# Patient Record
Sex: MALE
[De-identification: names, ages, dates, MRNs, and addresses within clinical notes are randomized per-mention and may not be internally consistent; named-entity substitution may affect disease eponyms.]

---

## 2022-02-07 PROBLEM — Z00.00 ENCOUNTER FOR PREVENTIVE HEALTH EXAMINATION: Status: ACTIVE | Noted: 2022-02-07

## 2023-06-28 ENCOUNTER — APPOINTMENT (OUTPATIENT)
Dept: ORTHOPEDIC SURGERY | Facility: CLINIC | Age: 68
End: 2023-06-28
Payer: MEDICARE

## 2023-06-28 VITALS — HEART RATE: 76 BPM | SYSTOLIC BLOOD PRESSURE: 124 MMHG | DIASTOLIC BLOOD PRESSURE: 84 MMHG

## 2023-06-28 DIAGNOSIS — M16.11 UNILATERAL PRIMARY OSTEOARTHRITIS, RIGHT HIP: ICD-10-CM

## 2023-06-28 DIAGNOSIS — Z79.891 LONG TERM (CURRENT) USE OF OPIATE ANALGESIC: ICD-10-CM

## 2023-06-28 DIAGNOSIS — S72.001A FRACTURE OF UNSPECIFIED PART OF NECK OF RIGHT FEMUR, INITIAL ENCOUNTER FOR CLOSED FRACTURE: ICD-10-CM

## 2023-06-28 DIAGNOSIS — M17.11 UNILATERAL PRIMARY OSTEOARTHRITIS, RIGHT KNEE: ICD-10-CM

## 2023-06-28 PROCEDURE — 27230 TREAT THIGH FRACTURE: CPT | Mod: RT

## 2023-06-28 PROCEDURE — 73502 X-RAY EXAM HIP UNI 2-3 VIEWS: CPT | Mod: RT

## 2023-06-28 PROCEDURE — 20610 DRAIN/INJ JOINT/BURSA W/O US: CPT | Mod: RT

## 2023-06-28 PROCEDURE — 73562 X-RAY EXAM OF KNEE 3: CPT | Mod: RT

## 2023-06-28 PROCEDURE — 99204 OFFICE O/P NEW MOD 45 MIN: CPT | Mod: 25,57

## 2023-06-28 NOTE — PHYSICAL EXAM
[LE] : Sensory: Intact in bilateral lower extremities [ALL] : dorsalis pedis, posterior tibial, femoral, popliteal, and radial 2+ and symmetric bilaterally [de-identified] : GENERAL APPEARANCE: Well nourished and hydrated, pleasant, alert, and oriented x 3. Appears their stated age. \par HEENT: Normocephalic, extraocular eye motion intact. Nasal septum midline. Oral cavity clear. External auditory canal clear. \par RESPIRATORY: Breath sounds clear and audible in all lobes. No wheezing, No accessory muscle use.\par CARDIOVASCULAR: No apparent abnormalities. No lower leg edema. No varicosities. Pedal pulses are palpable.\par NEUROLOGIC: Sensation is normal, no muscle weakness in the upper or lower extremities.\par DERMATOLOGIC: No apparent skin lesions, moist, warm, no rash.\par SPINE: Cervical spine appears normal and moves freely; thoracic spine appears normal and moves freely; lumbosacral spine appears normal and moves freely, normal, nontender.\par MUSCULOSKELETAL: Hands, wrists, and elbows are normal and move freely, shoulders are normal and move freely.  [de-identified] : Right hip exam shows no pain with ROM, -stinchfield \par Right knee exam shows medial jointline tenderness FROM [de-identified] : 3V xray of the right hip done in the office today and reviewed by Dr. Rajiv Ivy demonstrates mild right hip osteoarthritis \par \par 3V xray of the right knee done in the office today and reviewed by Dr. Rajiv Ivy demonstrates		 \par \par MRI of the right hip done at  on April 19th, 2023 shows: \par 1. Extensive bone marrow edema involving the posterior right femoral head and neck w/o associated discrete fracture line or compression deformity. Image findings suggestive of avascular necrosis and/or stress fracture/reaction.\par 2. Anterior superior labral tearing. Prominence of the anterolateral abdomen junction history of CAM-type femoral acetabular impingement morphology. \par 3. The right hip joint articular cartilage demonstrates mild-moderate diffuse thinning. \par 4. Small right-sided joint effusion. \par 5. Mild left greater than right greater trochanteric bursitis. \par 6. The scrotum demonstrates large hydroceles. Further evaluation with scrotal ultrasound may be of additional diagnostic 5. \par \par MRi of the right knee done at  on April 19th, 2023 shows: \par 1. The medial meniscus demonstrates subtle inferiorly surfacing oblique tear the junction of the posterior horn and body. \par 2. The lateral meniscus demonstrates incomplete radial tearing at the posterior root attachment. \par 3. Low-grade tricompartmental chondromalacia with mild diffuse thinning w/o subchondral changes. \par 4. Mild patella vinny.

## 2023-06-28 NOTE — PROCEDURE
[de-identified] : I injected the patient's right knee today with cortisone for primary osteoarthritis.\par \par I discussed at length with the patient the planned steroid and lidocaine injection. The risks, benefits, convalescence and alternatives were reviewed. The possible side effects discussed included but were not limited to: pain, swelling, heat, bleeding, and redness. Symptoms are generally mild but if they are extensive then contact the office. Giving pain relievers by mouth such as NSAIDs or Tylenol can generally treat the reactions to steroid and lidocaine. Rare cases of infection have been noted. Rash, hives and itching may occur post injection. If you have muscle pain or cramps, flushing and or swelling of the face, rapid heart beat, nausea, dizziness, fever, chills, headache, difficulty breathing, swelling in the arms or legs, or have a prickly feeling of your skin, contact a health care provider immediately. Following this discussion, the knee was prepped with Alcohol and under sterile condition the 80 mg Depo-Medrol and 6 cc Lidocaine injection was performed with a 20 gauge needle through a superolateral injection site. The needle was introduced into the joint, aspiration was performed to ensure intra-articular placement and the medication was injected. Upon withdrawal of the needle the site was cleaned with alcohol and a band aid applied. The patient tolerated the injection well and there were no adverse effects. Post injection instructions included no strenuous activity for 24 hours, cryotherapy and if there are any adverse effects to contact the office.

## 2023-06-28 NOTE — HISTORY OF PRESENT ILLNESS
[Stable] : stable [8] : a current pain level of 8/10 [6] : a minimum pain level of 6/10 [10] : a maximum pain level of 10/10 [de-identified] : 68 y/o M pt presents with right hip and knee pain. The pt has been having pain since april 2022. The pt states the ain is 8/10 and constant. He states  his pain is constant and radiating to his knees. The pt states the sharp, throbbing, shooting, and stabbing. The pt states states moving causes him pain. The pt states he feels better from not moving. The pt has pain with ice/compression. He was seen by a physician who discussed hip replacement. Regarding the knee, the pt states his knee gives out which causes him pain. He has had HA injections which did not help. His knee has been giving him the most pain. He had an MRI of the right knee which he would like to review.  \par The pt has a hx of COPD, spinal stenosis, herniated disc, and hyperlipidemia.

## 2023-06-28 NOTE — DISCUSSION/SUMMARY
[Medication Risks Reviewed] : Medication risks reviewed [de-identified] : 68 y/o M pt presents with mild right hip osteoarthritis and mild tricompartmental osteoarthritis of the right knee. The nature of his condition and treatment options were discussed in detail. The pt is not a candidate for a right ATA based on the Xray. The MRI does show inflammation of the bone which may show evidence of stress fracture or AVN. We recommend an MRI to better find the true etiology of his pain. We sent a script for an MRI. The pt understands, if he does show AVN, he is a candidate for a right ATA. Regarding the knee, the pt is not a candidate for a right TKA. The pt does show evidence of meniscal tearing. We recommend conservative treatment at this time. The pt opted for a right knee cortisone injection which he tolerated well. He will f/u when the MRI is ready.

## 2023-07-10 ENCOUNTER — APPOINTMENT (OUTPATIENT)
Dept: MRI IMAGING | Facility: CLINIC | Age: 68
End: 2023-07-10